# Patient Record
Sex: FEMALE | URBAN - METROPOLITAN AREA
[De-identification: names, ages, dates, MRNs, and addresses within clinical notes are randomized per-mention and may not be internally consistent; named-entity substitution may affect disease eponyms.]

---

## 2019-08-15 ENCOUNTER — OFFICE VISIT (OUTPATIENT)
Dept: URBAN - METROPOLITAN AREA CLINIC 18 | Facility: CLINIC | Age: 29
End: 2019-08-15
Payer: COMMERCIAL

## 2019-08-15 DIAGNOSIS — R51 HEADACHE: Primary | Chronic | ICD-10-CM

## 2019-08-15 PROCEDURE — 92004 COMPRE OPH EXAM NEW PT 1/>: CPT | Performed by: OPTOMETRIST

## 2019-08-15 ASSESSMENT — INTRAOCULAR PRESSURE
OS: 14
OD: 16

## 2019-08-15 ASSESSMENT — KERATOMETRY
OD: 43.13
OS: 43.13

## 2019-08-15 ASSESSMENT — VISUAL ACUITY
OS: 20/20
OD: 20/20

## 2019-08-15 NOTE — IMPRESSION/PLAN
Impression: Headache: R51. Plan: Frontal and maxillary sinus headaches, suspect sinus congestion, allergies, and/or vascular cause for headaches. Recommended further testing with neurology.